# Patient Record
Sex: MALE | Race: WHITE | NOT HISPANIC OR LATINO | Employment: OTHER | URBAN - METROPOLITAN AREA
[De-identification: names, ages, dates, MRNs, and addresses within clinical notes are randomized per-mention and may not be internally consistent; named-entity substitution may affect disease eponyms.]

---

## 2017-09-22 ENCOUNTER — OFFICE VISIT (OUTPATIENT)
Dept: MEDICAL GROUP | Facility: MEDICAL CENTER | Age: 42
End: 2017-09-22
Payer: MEDICARE

## 2017-09-22 VITALS
HEIGHT: 73 IN | DIASTOLIC BLOOD PRESSURE: 70 MMHG | WEIGHT: 276 LBS | RESPIRATION RATE: 16 BRPM | BODY MASS INDEX: 36.58 KG/M2 | TEMPERATURE: 98.7 F | OXYGEN SATURATION: 95 % | SYSTOLIC BLOOD PRESSURE: 122 MMHG | HEART RATE: 96 BPM

## 2017-09-22 DIAGNOSIS — D50.8 IRON DEFICIENCY ANEMIA SECONDARY TO INADEQUATE DIETARY IRON INTAKE: ICD-10-CM

## 2017-09-22 DIAGNOSIS — R11.15 INTRACTABLE CYCLICAL VOMITING WITH NAUSEA: ICD-10-CM

## 2017-09-22 DIAGNOSIS — Z13.220 SCREENING CHOLESTEROL LEVEL: ICD-10-CM

## 2017-09-22 DIAGNOSIS — Z23 NEED FOR PNEUMOCOCCAL VACCINATION: ICD-10-CM

## 2017-09-22 DIAGNOSIS — Z23 INFLUENZA VACCINE NEEDED: ICD-10-CM

## 2017-09-22 DIAGNOSIS — E66.9 OBESITY (BMI 35.0-39.9 WITHOUT COMORBIDITY): ICD-10-CM

## 2017-09-22 PROCEDURE — G0009 ADMIN PNEUMOCOCCAL VACCINE: HCPCS | Performed by: NURSE PRACTITIONER

## 2017-09-22 PROCEDURE — 90686 IIV4 VACC NO PRSV 0.5 ML IM: CPT | Performed by: NURSE PRACTITIONER

## 2017-09-22 PROCEDURE — G0008 ADMIN INFLUENZA VIRUS VAC: HCPCS | Performed by: NURSE PRACTITIONER

## 2017-09-22 PROCEDURE — 90732 PPSV23 VACC 2 YRS+ SUBQ/IM: CPT | Performed by: NURSE PRACTITIONER

## 2017-09-22 PROCEDURE — 99214 OFFICE O/P EST MOD 30 MIN: CPT | Mod: 25 | Performed by: NURSE PRACTITIONER

## 2017-09-22 RX ORDER — GABAPENTIN 300 MG/1
300 CAPSULE ORAL 3 TIMES DAILY
Qty: 90 CAP | Refills: 5 | Status: SHIPPED | OUTPATIENT
Start: 2017-09-22

## 2017-09-22 RX ORDER — GABAPENTIN 300 MG/1
300 CAPSULE ORAL 3 TIMES DAILY
Status: SHIPPED | COMMUNITY
End: 2017-09-22 | Stop reason: SDUPTHER

## 2017-09-22 RX ORDER — PROMETHAZINE HYDROCHLORIDE 25 MG/1
25 SUPPOSITORY RECTAL EVERY 6 HOURS PRN
Qty: 30 SUPPOSITORY | Refills: 5 | Status: SHIPPED | OUTPATIENT
Start: 2017-09-22 | End: 2020-07-02

## 2017-09-22 RX ORDER — TRAZODONE HYDROCHLORIDE 100 MG/1
100 TABLET ORAL
Qty: 30 TAB | Refills: 5 | Status: SHIPPED | OUTPATIENT
Start: 2017-09-22 | End: 2020-07-02 | Stop reason: SDUPTHER

## 2017-09-22 RX ORDER — TRAZODONE HYDROCHLORIDE 100 MG/1
100 TABLET ORAL NIGHTLY
Status: SHIPPED | COMMUNITY
End: 2017-09-22 | Stop reason: SDUPTHER

## 2017-09-22 ASSESSMENT — ENCOUNTER SYMPTOMS
VOMITING: 1
NAUSEA: 1

## 2017-09-22 NOTE — PROGRESS NOTES
Subjective:      Andrew Ross is a 42 y.o. male who presents with Establish Care            HPI Andrew Sharpe a prior patient of Dr. Forte here to establish care in need of medicines. I saw patient for same day visit over 3 years ago. He is accompanied today by his mother.      1. Intractable cyclical vomiting with nausea  Patient is on disability for this problem and it occurs probably once a month. It used to be more severe and he was going to gastroenterology and emergency room on a regular basis. Review of consult notes from gastroenterology shows patient had been tried on multiple medications for this including gabapentin, Zofran, Phenergan, imipramine, Bentyl, Elavil, Reglan, nortriptyline and trazodone with gabapentin seeming to work the best. When he does get nausea he will start on Phenergan. He needs refills on these medicines today. He did get a prescription for lorazepam through GI in January but uses it infrequently. He also smokes marijuana occasionally to help with the nausea.    2. Iron deficiency anemia secondary to inadequate dietary iron intake  This was a diagnosis listed in his chart that he does not have recent lab work. He reports no black stools or overt bleeding.      Current Outpatient Prescriptions   Medication Sig Dispense Refill   • trazodone (DESYREL) 100 MG Tab Take 1 Tab by mouth every bedtime. 30 Tab 5   • gabapentin (NEURONTIN) 300 MG Cap Take 1 Cap by mouth 3 times a day. 90 Cap 5   • promethazine (PHENERGAN) 25 MG Suppos Insert 1 Suppository in rectum every 6 hours as needed for Nausea/Vomiting. 30 Suppository 5   • lorazepam (ATIVAN) 1 MG TABS Take 1 Tab by mouth every 6 hours as needed. 20 Each 0     No current facility-administered medications for this visit.      Social History   Substance Use Topics   • Smoking status: Current Every Day Smoker     Packs/day: 0.50     Years: 10.00     Types: Cigarettes   • Smokeless tobacco: Never Used      Comment: 0.5 pack/day 8 years   •  "Alcohol use No      Comment: seldom     Past Medical History:   Diagnosis Date   • HTN (hypertension) 4/5/11   • ASTHMA     as child   • Cyclical vomiting syndrome    • HTN (hypertension)    • Indigestion    • Nausea    • Seizure disorder (CMS-HCC)     last seizure >3 yrs     Family History   Problem Relation Age of Onset   • Hypertension Mother    • Hyperlipidemia Mother    • Hypertension Father        Review of Systems   Gastrointestinal: Positive for nausea and vomiting.   All other systems reviewed and are negative.         Objective:     /70   Pulse 96   Temp 37.1 °C (98.7 °F)   Resp 16   Ht 1.854 m (6' 1\")   Wt (!) 125.2 kg (276 lb)   SpO2 95%   BMI 36.41 kg/m²      Physical Exam   Constitutional: He is oriented to person, place, and time. He appears well-developed and well-nourished. No distress.   HENT:   Head: Normocephalic and atraumatic.   Right Ear: External ear normal.   Left Ear: External ear normal.   Nose: Nose normal.   Mouth/Throat: Oropharynx is clear and moist.   Eyes: Conjunctivae are normal. Right eye exhibits no discharge. Left eye exhibits no discharge.   Neck: Normal range of motion. Neck supple. No tracheal deviation present. No thyromegaly present.   Cardiovascular: Normal rate, regular rhythm and normal heart sounds.    No murmur heard.  Pulmonary/Chest: Effort normal and breath sounds normal. No respiratory distress. He has no wheezes. He has no rales.   Abdominal: Soft. Bowel sounds are normal. He exhibits no distension and no mass. There is no tenderness. There is no rebound and no guarding. No hernia.   Lymphadenopathy:     He has no cervical adenopathy.   Neurological: He is alert and oriented to person, place, and time. Coordination normal.   Skin: Skin is warm and dry. No rash noted. He is not diaphoretic. No erythema.   Psychiatric: He has a normal mood and affect. His behavior is normal. Judgment and thought content normal.   Nursing note and vitals reviewed.       "        Assessment/Plan:     1. Intractable cyclical vomiting with nausea  I have refilled patient's 3 medicines today and he will continue to follow-up with gastroenterology. I recommended limiting his marijuana usage and I told him if he wants refills on his benzodiazepine, he will need to get this through GI since he smokes marijuana.  - trazodone (DESYREL) 100 MG Tab; Take 1 Tab by mouth every bedtime.  Dispense: 30 Tab; Refill: 5  - gabapentin (NEURONTIN) 300 MG Cap; Take 1 Cap by mouth 3 times a day.  Dispense: 90 Cap; Refill: 5  - promethazine (PHENERGAN) 25 MG Suppos; Insert 1 Suppository in rectum every 6 hours as needed for Nausea/Vomiting.  Dispense: 30 Suppository; Refill: 5  - COMP METABOLIC PANEL; Future    2. Iron deficiency anemia secondary to inadequate dietary iron intake  I will check to see if he is anemic and if he is, we will refer him back to GI to find out why.  - CBC WITHOUT DIFFERENTIAL; Future    3. Screening cholesterol level    - LIPID PROFILE; Future    4. Influenza vaccine needed  I have placed the below orders and discussed them with an approved delegating provider. The MA is performing the below orders under the direction of Dr. Sky  .  - Flu Quad Inj >3 Year Pre-Filled PF    5. Need for pneumococcal vaccination  I have placed the below orders and discussed them with an approved delegating provider. The MA is performing the below orders under the direction of Dr. Sky      - PNEUMOCOCCAL POLYSACCHARIDE VACCINE 23-VALENT =>1YO SQ/IM    6. Obesity (BMI 35.0-39.9 without comorbidity) (HCC)    - Patient identified as having weight management issue.  Appropriate orders and counseling given.

## 2020-07-02 ENCOUNTER — OFFICE VISIT (OUTPATIENT)
Dept: MEDICAL GROUP | Facility: MEDICAL CENTER | Age: 45
End: 2020-07-02
Payer: MEDICARE

## 2020-07-02 VITALS
HEIGHT: 73 IN | OXYGEN SATURATION: 95 % | TEMPERATURE: 96.9 F | DIASTOLIC BLOOD PRESSURE: 90 MMHG | BODY MASS INDEX: 38.01 KG/M2 | SYSTOLIC BLOOD PRESSURE: 142 MMHG | WEIGHT: 286.82 LBS | RESPIRATION RATE: 16 BRPM | HEART RATE: 101 BPM

## 2020-07-02 DIAGNOSIS — L98.9 SKIN LESION: ICD-10-CM

## 2020-07-02 DIAGNOSIS — I10 ESSENTIAL HYPERTENSION: ICD-10-CM

## 2020-07-02 DIAGNOSIS — Z13.6 ENCOUNTER FOR LIPID SCREENING FOR CARDIOVASCULAR DISEASE: ICD-10-CM

## 2020-07-02 DIAGNOSIS — R00.0 TACHYCARDIA: ICD-10-CM

## 2020-07-02 DIAGNOSIS — Z13.220 ENCOUNTER FOR LIPID SCREENING FOR CARDIOVASCULAR DISEASE: ICD-10-CM

## 2020-07-02 DIAGNOSIS — R11.15 INTRACTABLE CYCLICAL VOMITING WITH NAUSEA: ICD-10-CM

## 2020-07-02 PROCEDURE — 99214 OFFICE O/P EST MOD 30 MIN: CPT | Performed by: NURSE PRACTITIONER

## 2020-07-02 RX ORDER — TRAZODONE HYDROCHLORIDE 100 MG/1
100 TABLET ORAL
Qty: 90 TAB | Refills: 1 | Status: SHIPPED | OUTPATIENT
Start: 2020-07-02

## 2020-07-02 RX ORDER — GRANISETRON HYDROCHLORIDE 1 MG/1
1 TABLET, FILM COATED ORAL EVERY 12 HOURS PRN
COMMUNITY

## 2020-07-02 ASSESSMENT — ENCOUNTER SYMPTOMS
VOMITING: 1
PALPITATIONS: 1
INSOMNIA: 1
NAUSEA: 1

## 2020-07-02 ASSESSMENT — PATIENT HEALTH QUESTIONNAIRE - PHQ9: CLINICAL INTERPRETATION OF PHQ2 SCORE: 0

## 2020-07-02 NOTE — PROGRESS NOTES
Subjective:      Andrew Ross is a 45 y.o. male who presents with Follow-Up (sleeping medication changes ) and Bump (right buttcheek [id number] quater inch no pain )        CC: Patient is here today to reestablish care and for issues concerning a growth in his groin area, tachycardia and history of cyclical vomiting.    HPI         1. Essential hypertension  Patient has history of hypertension but has not been to a PCP in a while and has not been on medication.  His blood pressure is slightly elevated in the office today.  He was on a beta-blocker at one time.    2. Tachycardia  Patient's pulse is noted to be slightly fast and review of most recent consult notes from gastroenterology shows he was referred to cardiology.  He states he did not see a cardiologist but someone had advised a tilt table test but because of COVID-19, it was not done.  He states his heart rate is always been fast.  He denies drug usage.    3. Intractable cyclical vomiting with nausea  Chronic problem for which she has been following with gastroenterology.  He is on trazodone, gabapentin and Kytril.  He was smoking a lot of marijuana states he has cut back and is using edible THC.  He is on disability.    4. Skin lesion  Patient has a growth in his groin area which he feels is getting larger and tends to rub on things.  Would like to have it evaluated    5. Encounter for lipid screening for cardiovascular disease  Patient does not know when the last time he had lab work done.  Past Medical History:   Diagnosis Date   • ASTHMA     as child   • Cyclical vomiting syndrome    • HTN (hypertension)    • HTN (hypertension) 4/5/11   • Indigestion    • Nausea    • Seizure disorder (HCC)     last seizure >3 yrs     Social History     Socioeconomic History   • Marital status: Single     Spouse name: Not on file   • Number of children: Not on file   • Years of education: Not on file   • Highest education level: Not on file   Occupational History   • Not on  file   Social Needs   • Financial resource strain: Not on file   • Food insecurity     Worry: Not on file     Inability: Not on file   • Transportation needs     Medical: Not on file     Non-medical: Not on file   Tobacco Use   • Smoking status: Current Every Day Smoker     Packs/day: 0.50     Years: 10.00     Pack years: 5.00     Types: Cigarettes   • Smokeless tobacco: Never Used   • Tobacco comment: 0.5 pack/day 8 years   Substance and Sexual Activity   • Alcohol use: No     Comment: seldom   • Drug use: Yes     Comment: Edibles thc  and cbd    • Sexual activity: Not Currently   Lifestyle   • Physical activity     Days per week: Not on file     Minutes per session: Not on file   • Stress: Not on file   Relationships   • Social connections     Talks on phone: Not on file     Gets together: Not on file     Attends [employer] service: Not on file     Active member of club or organization: Not on file     Attends meetings of clubs or organizations: Not on file     Relationship status: Not on file   • Intimate partner violence     Fear of current or ex partner: Not on file     Emotionally abused: Not on file     Physically abused: Not on file     Forced sexual activity: Not on file   Other Topics Concern   • Not on file   Social History Narrative   • Not on file     Current Outpatient Medications   Medication Sig Dispense Refill   • granisetron (KYTRIL) 1 MG Tab Take 1 mg by mouth every 12 hours as needed for Nausea/Vomiting.     • metoprolol (LOPRESSOR) 25 MG Tab Take 0.5 Tabs by mouth 2 times a day. 60 Tab 11   • traZODone (DESYREL) 100 MG Tab Take 1 Tab by mouth every bedtime. 90 Tab 1   • gabapentin (NEURONTIN) 300 MG Cap Take 1 Cap by mouth 3 times a day. 90 Cap 5     No current facility-administered medications for this visit.      Family History   Problem Relation Age of Onset   • Hypertension Mother    • Hyperlipidemia Mother    • Hypertension Father          Review of Systems   Cardiovascular: Positive for  "palpitations.   Gastrointestinal: Positive for nausea and vomiting.   Psychiatric/Behavioral: The patient has insomnia.    All other systems reviewed and are negative.         Objective:     /90 (BP Location: Right arm, Patient Position: Sitting, BP Cuff Size: Adult)   Pulse (!) 101   Temp 36.1 °C (96.9 °F) (Temporal)   Resp 16   Ht 1.854 m (6' 1\")   Wt (!) 130.1 kg (286 lb 13.1 oz)   SpO2 95%   BMI 37.84 kg/m²      Physical Exam  Vitals signs and nursing note reviewed.   Constitutional:       General: He is not in acute distress.     Appearance: He is well-developed. He is not diaphoretic.   HENT:      Head: Normocephalic and atraumatic.      Right Ear: External ear normal.      Left Ear: External ear normal.      Nose: Nose normal.   Eyes:      General:         Right eye: No discharge.         Left eye: No discharge.      Conjunctiva/sclera: Conjunctivae normal.   Neck:      Musculoskeletal: Normal range of motion and neck supple.      Thyroid: No thyromegaly.      Trachea: No tracheal deviation.   Cardiovascular:      Rate and Rhythm: Normal rate and regular rhythm.      Heart sounds: Normal heart sounds. No murmur.   Pulmonary:      Effort: Pulmonary effort is normal. No respiratory distress.      Breath sounds: Normal breath sounds. No wheezing or rales.   Lymphadenopathy:      Cervical: No cervical adenopathy.   Skin:     General: Skin is warm and dry.      Findings: No erythema or rash.      Comments: Pale appearance.  There is a large tag-like growth in the groin region.   Neurological:      Mental Status: He is alert and oriented to person, place, and time.      Coordination: Coordination normal.   Psychiatric:         Behavior: Behavior normal.         Thought Content: Thought content normal.         Judgment: Judgment normal.                 Assessment/Plan:       1. Essential hypertension  Patient's blood pressure is elevated and pulse is fast.  He was on a beta-blocker at one time and " states he tolerated it well so I will start him on low-dose metoprolol.  He will stop it immediately if it causes any problems.  He is due for lab work.  - metoprolol (LOPRESSOR) 25 MG Tab; Take 0.5 Tabs by mouth 2 times a day.  Dispense: 60 Tab; Refill: 11  - Comp Metabolic Panel; Future  - CBC WITHOUT DIFFERENTIAL; Future    2. Tachycardia  I advised patient to follow-up with cardiology as recommended by his gastroenterologist to have this further worked up.  I will check thyroid.  He states he is not using drugs.  - TSH; Future    3. Intractable cyclical vomiting with nausea  Patient states he uses this for his vomiting as well as sleep and wonders if there is something else that should be used but I told him I did not feel comfortable getting him back on benzodiazepines as he was on at one time.  I will refill if needed.  - traZODone (DESYREL) 100 MG Tab; Take 1 Tab by mouth every bedtime.  Dispense: 90 Tab; Refill: 1    4. Skin lesion  Large tag-like growth causing irritation so I will refer to dermatology for possible removal.  - REFERRAL TO DERMATOLOGY    5. Encounter for lipid screening for cardiovascular disease    - Lipid Profile; Future

## 2021-06-04 ENCOUNTER — IMMUNIZATION (OUTPATIENT)
Dept: FAMILY PLANNING/WOMEN'S HEALTH CLINIC | Facility: IMMUNIZATION CENTER | Age: 46
End: 2021-06-04
Attending: INTERNAL MEDICINE
Payer: MEDICARE

## 2021-06-04 DIAGNOSIS — Z23 ENCOUNTER FOR VACCINATION: Primary | ICD-10-CM

## 2021-06-04 PROCEDURE — 91300 PFIZER SARS-COV-2 VACCINE: CPT | Performed by: INTERNAL MEDICINE

## 2021-06-04 PROCEDURE — 0002A PFIZER SARS-COV-2 VACCINE: CPT | Performed by: INTERNAL MEDICINE
